# Patient Record
Sex: FEMALE | Race: WHITE | NOT HISPANIC OR LATINO | Employment: FULL TIME | URBAN - METROPOLITAN AREA
[De-identification: names, ages, dates, MRNs, and addresses within clinical notes are randomized per-mention and may not be internally consistent; named-entity substitution may affect disease eponyms.]

---

## 2024-02-27 ENCOUNTER — OFFICE VISIT (OUTPATIENT)
Dept: URGENT CARE | Facility: CLINIC | Age: 29
End: 2024-02-27
Payer: COMMERCIAL

## 2024-02-27 ENCOUNTER — APPOINTMENT (OUTPATIENT)
Dept: RADIOLOGY | Facility: IMAGING CENTER | Age: 29
End: 2024-02-27
Attending: PHYSICIAN ASSISTANT
Payer: COMMERCIAL

## 2024-02-27 VITALS
RESPIRATION RATE: 16 BRPM | TEMPERATURE: 98 F | WEIGHT: 186.3 LBS | SYSTOLIC BLOOD PRESSURE: 110 MMHG | DIASTOLIC BLOOD PRESSURE: 64 MMHG | OXYGEN SATURATION: 96 % | HEART RATE: 92 BPM | BODY MASS INDEX: 26.67 KG/M2 | HEIGHT: 70 IN

## 2024-02-27 DIAGNOSIS — S79.911A INJURY OF RIGHT HIP, INITIAL ENCOUNTER: ICD-10-CM

## 2024-02-27 DIAGNOSIS — S70.01XA CONTUSION OF RIGHT HIP, INITIAL ENCOUNTER: ICD-10-CM

## 2024-02-27 PROCEDURE — 73502 X-RAY EXAM HIP UNI 2-3 VIEWS: CPT | Mod: TC,RT | Performed by: PHYSICIAN ASSISTANT

## 2024-02-27 PROCEDURE — 3074F SYST BP LT 130 MM HG: CPT | Performed by: PHYSICIAN ASSISTANT

## 2024-02-27 PROCEDURE — 3078F DIAST BP <80 MM HG: CPT | Performed by: PHYSICIAN ASSISTANT

## 2024-02-27 PROCEDURE — 99204 OFFICE O/P NEW MOD 45 MIN: CPT | Performed by: PHYSICIAN ASSISTANT

## 2024-02-27 ASSESSMENT — ENCOUNTER SYMPTOMS
FEVER: 0
NAUSEA: 0
MYALGIAS: 0
SHORTNESS OF BREATH: 0
CHILLS: 0
COUGH: 0
CONSTIPATION: 0
VOMITING: 0
SORE THROAT: 0
ABDOMINAL PAIN: 0
EYE PAIN: 0
HEADACHES: 0
DIARRHEA: 0

## 2024-02-27 NOTE — LETTER
February 27, 2024    To Whom It May Concern:         This is confirmation that Lanette Zhang attended her scheduled appointment with Nicholas Martinez P.A.-C. on 2/27/24.  I recommend this patient be on light duty.  She needs the assistance of a cane or crutches.  She may even need to remain out of work for 1-5 days to recover or until she is directed otherwise by an orthopedist.          If you have any questions please do not hesitate to call me at the phone number listed below.    Sincerely,          Nicholas Martinez P.A.-C.  [phone]

## 2024-02-28 NOTE — PROGRESS NOTES
"Subjective:   Lanette Zhang is a 29 y.o. female who presents for Hip Injury (XR hip  fell on right side snowboarding and is now in severe pain )      29-year-old female had a mechanical fall while snowboarding 3 days ago.  She fell directly on her right hip while snowboarding.  She presumed that there is some bruising and soft tissue injury however over the last 3 days it does not seem to be improving.  She has pain with flexion.  She notes more pain with weightbearing.  She seems to wake up and her hip feels better and the more she walks or places weight on it seems to hurt more.  She has been using a cane that she borrowed to help offload weight.  No prior injury.    Review of Systems   Constitutional:  Negative for chills and fever.   HENT:  Negative for congestion, ear pain and sore throat.    Eyes:  Negative for pain.   Respiratory:  Negative for cough and shortness of breath.    Cardiovascular:  Negative for chest pain.   Gastrointestinal:  Negative for abdominal pain, constipation, diarrhea, nausea and vomiting.   Genitourinary:  Negative for dysuria.   Musculoskeletal:  Negative for myalgias.   Skin:  Negative for rash.   Neurological:  Negative for headaches.       Medications, Allergies, and current problem list reviewed today in Epic.     Objective:     /64 (BP Location: Right arm, Patient Position: Sitting, BP Cuff Size: Adult)   Pulse 92   Temp 36.7 °C (98 °F) (Skin)   Resp 16   Ht 1.778 m (5' 10\")   Wt 84.5 kg (186 lb 4.8 oz)   SpO2 96%     Physical Exam  Vitals reviewed.   Constitutional:       Appearance: Normal appearance.   HENT:      Head: Normocephalic and atraumatic.      Right Ear: External ear normal.      Left Ear: External ear normal.      Nose: Nose normal.      Mouth/Throat:      Mouth: Mucous membranes are moist.   Eyes:      Conjunctiva/sclera: Conjunctivae normal.   Cardiovascular:      Rate and Rhythm: Normal rate.   Pulmonary:      Effort: Pulmonary effort is normal. "   Musculoskeletal:      Comments: Nontender over lateral and anterior hip or greater trochanter.  No pain with internal/external logroll abduction adduction.  Pain past 30 degrees of flexion and flexed internal rotation.  Distally neurovascularly intact.   Skin:     General: Skin is warm and dry.      Capillary Refill: Capillary refill takes less than 2 seconds.   Neurological:      Mental Status: She is alert and oriented to person, place, and time.         RADIOLOGY RESULTS   DX-HIP-COMPLETE - UNILATERAL 2+ RIGHT    Result Date: 2/27/2024 2/27/2024 6:18 PM HISTORY/REASON FOR EXAM:  Pelvic/Hip Pain Following Trauma; right hip pain after snowboarding fall. pain with weight bearing, mostly medial/internal TECHNIQUE/EXAM DESCRIPTION AND NUMBER OF VIEWS:  2 views of the RIGHT hip. COMPARISON: None FINDINGS: Questionable lucency in the superior acetabulum. No joint osteoarthritis. Evaluation of the sacrum and bilateral iliac crests is limited due to overlying bowel content.     Questionable lucency in the superior acetabulum. This could be artifact or nondisplaced fracture. Further evaluation with CT is recommended.             Assessment/Plan:     Diagnosis and associated orders:     1. Contusion of right hip, initial encounter  DX-HIP-COMPLETE - UNILATERAL 2+ RIGHT    Referral to Orthopedics      2. Injury of right hip, initial encounter  Referral to Orthopedics         Comments/MDM:     Patient seen during imaging downtime.  I did a wet read and saw some possible injury to the acetabular socket, could be a acetabular soft tissue or cartilaginous injury.  Imaging pending at this time.  Recommend patient be nonweightbearing or weightbearing as tolerated with crutches, recommend follow-up with orthopedics, referral placed today.  Although limited wet read in clinic I did not appreciate a femur or femoral neck injury  Update: 2/28/24, formal read available which does show potential acetabular injury.  Called patient and  left voicemail encouraging close immediate follow up with orthopedics.          Differential diagnosis, natural history, supportive care, and indications for immediate follow-up discussed.    Advised the patient to follow-up with the primary care physician for recheck, reevaluation, and consideration of further management.    Please note that this dictation was created using voice recognition software. I have made a reasonable attempt to correct obvious errors, but I expect that there are errors of grammar and possibly content that I did not discover before finalizing the note.    This note was electronically signed by Nicholas Martinez PA-C

## 2025-05-24 ENCOUNTER — OCCUPATIONAL MEDICINE (OUTPATIENT)
Dept: URGENT CARE | Facility: CLINIC | Age: 30
End: 2025-05-24
Payer: COMMERCIAL

## 2025-05-24 VITALS
HEIGHT: 70 IN | WEIGHT: 187.2 LBS | BODY MASS INDEX: 26.8 KG/M2 | RESPIRATION RATE: 13 BRPM | DIASTOLIC BLOOD PRESSURE: 62 MMHG | HEART RATE: 86 BPM | SYSTOLIC BLOOD PRESSURE: 104 MMHG | OXYGEN SATURATION: 98 % | TEMPERATURE: 97.4 F
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
## 2025-05-24 PROCEDURE — 3078F DIAST BP <80 MM HG: CPT

## 2025-05-24 PROCEDURE — 3074F SYST BP LT 130 MM HG: CPT

## 2025-05-24 PROCEDURE — 90715 TDAP VACCINE 7 YRS/> IM: CPT

## 2025-05-24 PROCEDURE — 99213 OFFICE O/P EST LOW 20 MIN: CPT | Mod: 25

## 2025-05-24 PROCEDURE — 90471 IMMUNIZATION ADMIN: CPT

## 2025-05-24 RX ORDER — AMOXICILLIN AND CLAVULANATE POTASSIUM 500; 125 MG/1; MG/1
1 TABLET, FILM COATED ORAL 3 TIMES DAILY
COMMUNITY
End: 2025-05-24

## 2025-05-24 NOTE — LETTER
"  EMPLOYEE’S CLAIM FOR COMPENSATION/ REPORT OF INITIAL TREATMENT  FORM C-4  PLEASE TYPE OR PRINT    EMPLOYEE’S CLAIM - PROVIDE ALL INFORMATION REQUESTED   First Name                    SUSHMA Gama                  Last Name  Vicente Birthdate                    1995                Sex  [x]Female Claim Number (Insurer’s Use Only)     Mailing Address  [address] Age  30 y.o. Height  1.778 m (5' 10\") Weight  84.9 kg (187 lb 3.2 oz) Social Security Number     [facility] Zip  [address] Telephone  There are no phone numbers on file.   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 (if applicable)  restraining/trying to muzzle dog    How did this injury or occupational disease occur? (Be specific and answer in detail. Use additional sheet if necessary)  I claribel Pet w/ injured leg back to our treatment area for triage. Pet was unfriendly so tried to muzzle, Pet wiggled & was unable to muzzle, pet flipped over and chomped on hand   If you believe that you have an occupational disease, when did you first have knowledge of the disability and its relationship to your employment?  NA Witnesses to the Accident (if applicable)  Trupti Grace      Nature of Injury or " Occupational Disease  Puncture  Part(s) of Body Injured or Affected  Hand (R) N/A N/A    I CERTIFY THAT THE ABOVE IS TRUE AND CORRECT TO T HE BEST OF MY KNOWLEDGE AND THAT I HAVE PROVIDED THIS INFORMATION IN ORDER TO OBTAIN THE BENEFITS OF [place]CC[place], OR CHAPTER [place]).  I HEREBY AUTHORIZE ANY PHYSICIAN, CHIROPRACTOR, SURGEON, PRACTITIONER OR ANY OTHER PERSON, ANY HOSPITAL, INCLUDING [facility], ANY  MEDICAL SERVICE ORGANIZATION, ANY INSURANCE COMPANY, OR OTHER INSTITUTION OR ORGANIZATION TO RELEASE TO EACH OTHER, ANY MEDICAL OR OTHER INFORMATION, INCLUDING BENEFITS PAID OR PAYABLE, PERTINENT TO THIS INJURY OR DISEASE, EXCEPT INFORMATION RELATIVE TO DIAGNOSIS, TREATMENT AND/OR COUNSELING FOR AIDS, PSYCHOLOGICAL CONDITIONS, ALCOHOL OR CONTROLLED SUBSTANCES, FOR WHICH I MUST GIVE SPECIFIC AUTHORIZATION.  A PHOTOSTAT OF THIS AUTHORIZATION SHALL BE VALID AS THE ORIGINAL.     Date   Place Employee’s Original or  *Electronic Signature   THIS REPORT MUST BE COMPLETED AND MAILED WITHIN 3 WORKING DAYS OF TREATMENT   Place  [facility]    Name of [facility]   Date 5/24/2025 Diagnosis and Description of Injury or Occupational Disease  (W54.0XXA) Dog bite, initial encounter  (primary encounter diagnosis)  The encounter diagnosis was Dog bite, initial encounter. Is there evidence that the injured employee was under the influence of alcohol and/or another controlled substance at the time of accident?  []No  [] Yes (if yes, please explain)   Hour 9:21 PM  No   Treatment: ounds washed with NS and Hibiclens in office, Polysporin applied wounds are not actively bleeding, wounds dressed with self-adhesive bandages.  Home wound care reviewed.    Will have her follow-up in work restrictions: Patient is to keep wounds covered, will likely need to wear a glove due to the scattered nature of the  wound.  Otherwise no work restrictions are necessary      Have you advised the patient to remain off work five days or more?   No  [] Yes  If yes, indicate dates: From_ _                                                      To __ _  [] No   If no, is the injured employee capable of: [] full duty Yes   [] modified duty      If modified duty, specify any limitations / restrictions:__________________  ___ ___________________________     X-Ray Findings:  NA    From information given by the employee, together with medical evidence, can you directly connect this injury or occupational disease as job incurred?  []Yes   [] No Yes    Is additional medical care by a physician indicated? []Yes [] No  Yes    Do you know of any previous injury or disease contributing to this condition or occupational disease? []Yes [] No (Explain if yes)                          No   Date  5/24/2025 Print Health Care Provider’s Name  GENIE Coyle I certify that the employer’s copy of  this form was delivered to the employer on:   Address  [address] [place] [address] INSURER'S USE ONLY                       [facility] Zip  [address] Provider’s Tax ID Number  [id number]   Telephone  Dept: [phone]    Health Care Provider’s Original or Electronic Signature      e-SignPETROY MORGAN    Degree (MD,DO, DC,PA-C,APRN)    Choose (if applicable)      ORIGINAL - TREATING HEALTHCARE PROVIDER PAGE 2 - INSURER/TPA PAGE 3 - EMPLOYER PAGE 4 - EMPLOYEE             Form C-4 (rev.02/25)

## 2025-05-24 NOTE — LETTER
PHYSICIAN’S AND CHIROPRACTIC PHYSICIAN'S   PROGRESS REPORT   CERTIFICATION OF DISABILITY Claim Number:     Social Security Number:    Patient’s Name: Lanette Zhang Date of Injury: 5/24/2025   Employer:   Name of [employer]CO (if applicable):      Patient’s Job Description/Occupation: Tech Assistant/       Previous Injuries/Diseases/Surgeries Contributing to the Condition:  Denies      Diagnosis: (W54.0XXA) Dog bite, initial encounter  (primary encounter diagnosis)      Related to the Industrial Injury? Yes     Explain: Visit #1  DOI: 05/24/25  C/C: Dog bite, right hand    Per patient, she was at work, working with a [employer].  States she inaccurately assessed for to put the muscle, and the dog flipped over, resulting in some scattered superficial dog bites to her right hand.  Wounds are mostly located over the thumb, first interdigital webspace, second finger.  She reports little bruising of the finger just below the PIP joint otherwise denies foreign body sensation.  Bleeding controlled prior to arrival in urgent care.  Denies history of right hand injuries.  She is right-hand dominant.  Denies secondary.  Denies weakness, numbness/tingling sensation, reduced range of motion.  Does not recall when her last Tdap was administered.       Objective Medical Findings: Vitals and nursing note reviewed.   Constitutional:       General: She is not in acute distress.     Appearance: Normal appearance. She is not ill-appearing or diaphoretic.   HENT:      Head: Normocephalic.   Cardiovascular:      Rate and Rhythm: Normal rate and regular rhythm.      Heart sounds: Normal heart sounds.   Pulmonary:      Effort: Pulmonary effort is normal.      Breath sounds: Normal breath sounds.   Musculoskeletal:         General: No swelling, tenderness, deformity or signs of injury.   Skin:     General: Skin is warm and dry.      Capillary Refill: Capillary refill takes less than 2 seconds.      Findings: Bruising and wound present.       Comments: Scattered superficial bite wounds primarily present on the dorsal surface of the right hand along the thumb, first interdigital webspace, second finger.  No foreign bodies present.  Strength is normal and symmetric,  strength is 5 out of 5, sensation is intact to light and sharp touch, cap refill less than 2 seconds, 2+ radial pulse.  No edema, erythema, warmth, fluctuance, rash.  There is faint ecchymosis distal of the second PIP joint.  Hemostasis achieved prior to evaluation in office.    Neurological:      Mental Status: She is alert and oriented to person, place, and time.          None - Discharged                         Stable  No                 Ratable  No        Generally Improved                         Condition Worsened                  Condition Same  May Have Suffered a Permanent Disability No     Treatment Plan:    D39 and C4 completed today.  Patient received Tdap in office, she tolerated well.  Will start her on prophylactic Augmentin for dog bite sustained today.    Wounds washed with NS and Hibiclens in office, Polysporin applied wounds are not actively bleeding, wounds dressed with self-adhesive bandages.  Home wound care reviewed.    Will have her follow-up in work restrictions: Patient is to keep wounds covered, will likely need to wear a glove due to the scattered nature of the wound.  Otherwise no work restrictions are necessary  Will have her follow-up in 2 days for a wound check.         No Change in Therapy                  PT/OT Prescribed                      Medication May be Used While Working        Case Management                          PT/OT Discontinued    Consultation    Further Diagnostic Studies:    Prescription(s)                 Released to FULL DUTY /No Restrictions on (Date):       Certified TOTALLY TEMPORARILY DISABLED (Indicate Dates) From:   To:      Released to RESTRICTED/Modified Duty on (Date): From:   To:    Restrictions Are:         No Sitting     No Standing    No Pulling Other: Keep wounds covered, may need to wear a glove while at work.       No Bending at Waist     No Stooping     No Lifting        No Carrying     No Walking Lifting Restricted to (lbs.):          No Pushing        No Climbing     No Reaching Above Shoulders       Date of Next Visit:  5/26/2025 Date of this Exam: 5/24/2025 Physician/Chiropractic Physician Name: GENIE Coyle Physician/Chiropractic Physician Signature:  Fredy Albarado DO MPH     [place]:  [facility]  [facility], [address] [place], [place] [address] - Telephone [phone] [place]:  [address] [place] [address], [address] [place], [place] [address] - Telephone [phone]    [email or web address]  D-39 (Rev. 10/24)

## 2025-05-25 NOTE — PROGRESS NOTES
"Subjective:   Lanette Zhang is a 30 y.o. female who presents for Other (NEW WC DOI: 05/24/25 dog bite on right hand )      HPI:    Visit #1  DOI: 05/24/25  C/C: Dog bite, right hand    Per patient, she was at work, working with a [employer].  States she inaccurately assessed for to put the muscle, and the dog flipped over, resulting in some scattered superficial dog bites to her right hand.  Wounds are mostly located over the thumb, first interdigital webspace, second finger.  She reports little bruising of the finger just below the PIP joint otherwise denies foreign body sensation.  Bleeding controlled prior to arrival in urgent care.  Denies history of right hand injuries.  She is right-hand dominant.  Denies secondary.  Denies weakness, numbness/tingling sensation, reduced range of motion.  Does not recall when her last Tdap was administered.     ROS As above in HPI    Medications:    Medications Ordered Prior to Encounter[1]     Allergies:   Sulfa drugs    Problem List:   Problem List[2]     Surgical History:  No past surgical history on file.    Past Social Hx:   Social History[3]       Problem list, medications, and allergies reviewed by myself today in Epic.     Objective:     /62 (BP Location: Left arm, Patient Position: Sitting, BP Cuff Size: Adult)   Pulse 86   Temp 36.3 °C (97.4 °F) (Temporal)   Resp 13   Ht 1.778 m (5' 10\")   Wt 84.9 kg (187 lb 3.2 oz)   SpO2 98%   BMI 26.86 kg/m²     Physical Exam  Vitals and nursing note reviewed.   Constitutional:       General: She is not in acute distress.     Appearance: Normal appearance. She is not ill-appearing or diaphoretic.   HENT:      Head: Normocephalic.   Cardiovascular:      Rate and Rhythm: Normal rate and regular rhythm.      Heart sounds: Normal heart sounds.   Pulmonary:      Effort: Pulmonary effort is normal.      Breath sounds: Normal breath sounds.   Musculoskeletal:         General: No swelling, tenderness, deformity or signs of " injury.   Skin:     General: Skin is warm and dry.      Capillary Refill: Capillary refill takes less than 2 seconds.      Findings: Bruising and wound present.      Comments: Scattered superficial bite wounds primarily present on the dorsal surface of the right hand along the thumb, first interdigital webspace, second finger.  No foreign bodies present.  Strength is normal and symmetric,  strength is 5 out of 5, sensation is intact to light and sharp touch, cap refill less than 2 seconds, 2+ radial pulse.  No edema, erythema, warmth, fluctuance, rash.  There is faint ecchymosis distal of the second PIP joint.  Hemostasis achieved prior to evaluation in office.    Neurological:      Mental Status: She is alert and oriented to person, place, and time.         Assessment/Plan:       Diagnosis and associated orders:   1. Dog bite, initial encounter (Primary)  - Tdap =>8yo IM  - amoxicillin-clavulanate (AUGMENTIN) 875-125 MG Tab; Take 1 Tablet by mouth 2 times a day for 5 days.  Dispense: 10 Tablet; Refill: 0       Comments/MDM:       D39 and C4 completed today.  Patient received Tdap in office, she tolerated well.  Will start her on prophylactic Augmentin for dog bite sustained today.    Wounds washed with NS and Hibiclens in office, Polysporin applied wounds are not actively bleeding, wounds dressed with self-adhesive bandages.  Home wound care reviewed.    Will have her follow-up in work restrictions: Patient is to keep wounds covered, will likely need to wear a glove due to the scattered nature of the wound.  Otherwise no work restrictions are necessary  Will have her follow-up in 2 days for a wound check.       Return to clinic or go to ED if symptoms worsen or persist. Indications for ED discussed at length. Patient/Parent/Guardian voices understanding. Follow-up with your primary care provider in 3-5 days. Red flag symptoms discussed. All side effects of medication discussed including allergic response, GI  upset, tendon injury, rash, sedation etc.    Please note that this dictation was created using voice recognition software. I have made a reasonable attempt to correct obvious errors, but I expect that there are errors of grammar and possibly content that I did not discover before finalizing the note.    This note was electronically signed by MOR Hooks         [1]   Current Outpatient Medications on File Prior to Visit   Medication Sig Dispense Refill    IBUPROFEN PO Take  by mouth.       No current facility-administered medications on file prior to visit.   [2] There is no problem list on file for this patient.   [3]   Social History  Tobacco Use    Smoking status: Never    Smokeless tobacco: Never   Vaping Use    Vaping status: Every Day    Substances: Nicotine, THC    Devices: Disposable, Refillable tank, Pre-filled pod   Substance Use Topics    Alcohol use: Yes     Comment: occ    Drug use: Yes     Types: Marijuana

## 2025-05-26 ENCOUNTER — OCCUPATIONAL MEDICINE (OUTPATIENT)
Dept: URGENT CARE | Facility: CLINIC | Age: 30
End: 2025-05-26
Payer: COMMERCIAL

## 2025-05-26 VITALS
RESPIRATION RATE: 15 BRPM | SYSTOLIC BLOOD PRESSURE: 106 MMHG | OXYGEN SATURATION: 99 % | TEMPERATURE: 97.7 F | HEART RATE: 82 BPM | WEIGHT: 187.2 LBS | DIASTOLIC BLOOD PRESSURE: 78 MMHG | BODY MASS INDEX: 26.8 KG/M2 | HEIGHT: 70 IN
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
## 2025-05-26 PROCEDURE — 3078F DIAST BP <80 MM HG: CPT | Performed by: STUDENT IN AN ORGANIZED HEALTH CARE EDUCATION/TRAINING PROGRAM

## 2025-05-26 PROCEDURE — 99213 OFFICE O/P EST LOW 20 MIN: CPT | Performed by: STUDENT IN AN ORGANIZED HEALTH CARE EDUCATION/TRAINING PROGRAM

## 2025-05-26 PROCEDURE — 3074F SYST BP LT 130 MM HG: CPT | Performed by: STUDENT IN AN ORGANIZED HEALTH CARE EDUCATION/TRAINING PROGRAM

## 2025-05-26 RX ORDER — AMOXICILLIN AND CLAVULANATE POTASSIUM 500; 125 MG/1; MG/1
TABLET, FILM COATED ORAL
COMMUNITY
Start: 2025-05-19

## 2025-05-26 NOTE — PROGRESS NOTES
"Subjective:     Lanette Zhang is a 30 y.o. female who presents for Work-Related Injury ( [facility]- DOI 5.24.25 - R hand - [facility]. )    Today's date: 5/26/2025.  Visit #2.    Patient is here to follow-up of the dog bite she sustained to her right index and right thumb finger a few days ago.  At her initial visit she was sent a prescription for Augmentin which she has not yet started.  States her fingers feel slightly bruised but is not experiencing worsening or new pain.  No significant redness or swelling.  No additional complaints or concerns.       PMH:   No pertinent past medical history to this problem  MEDS:  Medications were reviewed in EMR  ALLERGIES:  Allergies were reviewed in EMR  FH:   No pertinent family history to this problem       Objective:     /78 (BP Location: Left arm, Patient Position: Sitting, BP Cuff Size: Adult)   Pulse 82   Temp 36.5 °C (97.7 °F) (Temporal)   Resp 15   Ht 1.778 m (5' 10\")   Wt 84.9 kg (187 lb 3.2 oz)   SpO2 99%   BMI 26.86 kg/m²     Gen: no acute distress, normal voice  Skin: dry, intact, moist mucosal membranes  Head: Atraumatic, normocephalic  Psych: normal affect, normal judgement, alert, awake  Musculoskeletal: Right thumb with 2 superficial puncture wounds.  Right index finger with approximately 3 superficial puncture wounds.  Wounds are appropriately healing without any surrounding erythema, edema or signs of subcutaneous extension.  Full range of motion of digits associated with mild discernible discomfort with terminal flexion.  No motor or sensory deficits with good perfusion of soft tissue.      Assessment/Plan:       1. Dog bite, subsequent encounter    Other orders  - amoxicillin-clavulanate (AUGMENTIN) 500-125 MG Tab      FROM   TO            - Instructed to begin Augmentin  -No work restrictions; follow-up Friday at the end of this week would likely be able to discharge at that time    "

## 2025-05-26 NOTE — LETTER
PHYSICIAN’S AND CHIROPRACTIC PHYSICIAN'S   PROGRESS REPORT   CERTIFICATION OF DISABILITY Claim Number:     Social Security Number:    Patient’s Name: Lanette Zhang Date of Injury: 5/24/2025   Employer:   Name of MCO (if applicable):      Patient’s Job Description/Occupation: Tech Assistant/       Previous Injuries/Diseases/Surgeries Contributing to the Condition:  Today's date: 5/26/2025.  Visit #2.    Patient is here to follow-up of the dog bite she sustained to her right index and right thumb finger a few days ago.  At her initial visit she was sent a prescription for Augmentin which she has not yet started.  States her fingers feel slightly bruised but is not experiencing worsening or new pain.  No significant redness or swelling.  No additional complaints or concerns.      Diagnosis: (W54.0XXD) Dog bite, subsequent encounter  (primary encounter diagnosis)      Related to the Industrial Injury? Yes     Explain: Happened at work      Objective Medical Findings: Gen: no acute distress, normal voice  Skin: dry, intact, moist mucosal membranes  Head: Atraumatic, normocephalic  Psych: normal affect, normal judgement, alert, awake  Musculoskeletal: Right thumb with 2 superficial puncture wounds.  Right index finger with approximately 3 superficial puncture wounds.  Wounds are appropriately healing without any surrounding erythema, edema or signs of subcutaneous extension.  Full range of motion of digits associated with mild discernible discomfort with terminal flexion.  No motor or sensory deficits with good perfusion of soft tissue.           None - Discharged                         Stable  No                 Ratable  No        Generally Improved                         Condition Worsened                  Condition Same  May Have Suffered a Permanent Disability No     Treatment Plan:    - Instructed to begin Augmentin  -No work restrictions; follow-up Friday at the end of this week would likely be able to  discharge at that time         No Change in Therapy                  PT/OT Prescribed                      Medication May be Used While Working        Case Management                          PT/OT Discontinued    Consultation    Further Diagnostic Studies:    Prescription(s)               X  Released to FULL DUTY /No Restrictions on (Date):       Certified TOTALLY TEMPORARILY DISABLED (Indicate Dates) From:   To:      Released to RESTRICTED/Modified Duty on (Date): From:   To:    Restrictions Are:         No Sitting    No Standing    No Pulling Other:         No Bending at Waist     No Stooping     No Lifting        No Carrying     No Walking Lifting Restricted to (lbs.):          No Pushing        No Climbing     No Reaching Above Shoulders       Date of Next Visit:  5/30/2025 Date of this Exam: 5/26/2025 Physician/Chiropractic Physician Name: Khurram Watson D.O. Physician/Chiropractic Physician Signature:  Fredy Albarado DO [facility]:  [address] [place] [address], [address] [place], [place] [address] - Telephone [phone] [place]:  [address] [place] [address], [address] [place], [place] [address] - Telephone [phone]    [email or web address]  D-39 (Rev. 10/24)

## 2025-05-30 ENCOUNTER — OCCUPATIONAL MEDICINE (OUTPATIENT)
Dept: URGENT CARE | Facility: CLINIC | Age: 30
End: 2025-05-30
Payer: COMMERCIAL

## 2025-05-30 VITALS
HEIGHT: 70 IN | DIASTOLIC BLOOD PRESSURE: 70 MMHG | BODY MASS INDEX: 26.58 KG/M2 | WEIGHT: 185.7 LBS | RESPIRATION RATE: 20 BRPM | TEMPERATURE: 98 F | HEART RATE: 71 BPM | SYSTOLIC BLOOD PRESSURE: 110 MMHG | OXYGEN SATURATION: 98 %
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
## 2025-05-30 NOTE — PROGRESS NOTES
"Subjective     Lanette Zhang is a 30 y.o. female who presents with Follow-Up (X5/24/25 Right thumb/index finger/dog bite)    Visit #1  DOI: 05/24/25  C/C: Dog bite, right hand   Per patient, she was at work, working with a [employer].  States she inaccurately assessed for to put the muscle, and the dog flipped over, resulting in some scattered superficial dog bites to her right hand.  Wounds are mostly located over the thumb, first interdigital webspace, second finger.  She reports little bruising of the finger just below the PIP joint otherwise denies foreign body sensation.  Bleeding controlled prior to arrival in urgent care.  Denies history of right hand injuries.  She is right-hand dominant.  Denies secondary.  Denies weakness, numbness/tingling sensation, reduced range of motion.  Does not recall when her last Tdap was administered.    ** Today 5/30/2025: Third visit for dog bite wounds check of the right hand.  Has been on antibiotics for 2 or 3 days now.  Pain is slowly improving and is using right hand as tolerated while at work and try not to lift heavy things.          HPI    Review of Systems   All other systems reviewed and are negative.             Objective     /70   Pulse 71   Temp 36.7 °C (98 °F) (Temporal)   Resp 20   Ht 1.778 m (5' 10\")   Wt 84.2 kg (185 lb 11.2 oz)   LMP 05/10/2025 (Exact Date)   SpO2 98%   BMI 26.65 kg/m²      Physical Exam  Musculoskeletal:        Hands:       Comments: Right hand with some superficial puncture wounds over the right index and thumb joints         Superficial puncture wounds over the right thumb and index finger          1. Dog bite, subsequent encounter            10-day recheck sooner if needed    Over-the-counter Motrin or naproxen as needed    Use right hand only as tolerated while at work     "

## 2025-05-30 NOTE — LETTER
PHYSICIAN’S AND CHIROPRACTIC PHYSICIAN'S   PROGRESS REPORT   CERTIFICATION OF DISABILITY Claim Number:     Social Security Number:    Patient’s Name: Lanette Zhang Date of Injury: 5/24/2025   Employer:  [employer] Name of MCO (if applicable):      Patient’s Job Description/Occupation:        Previous Injuries/Diseases/Surgeries Contributing to the Condition:  None none      Diagnosis: (W54.0XXD) Dog bite, subsequent encounter  (primary encounter diagnosis)      Related to the Industrial Injury? Yes     Explain: Dog bite      Objective Medical Findings: Superficial puncture wounds over the right thumb and index finger         None - Discharged                         Stable  No                 Ratable  No     X   Generally Improved                         Condition Worsened                  Condition Same  May Have Suffered a Permanent Disability No     Treatment Plan:              No Change in Therapy                  PT/OT Prescribed                      Medication May be Used While Working        Case Management                          PT/OT Discontinued    Consultation    Further Diagnostic Studies:    Prescription(s)                 Released to FULL DUTY /No Restrictions on (Date):       Certified TOTALLY TEMPORARILY DISABLED (Indicate Dates) From:   To:    X  Released to RESTRICTED/Modified Duty on (Date): From: 5/30/2025 To: 6/9/2025  Restrictions Are:  Temporary      No Sitting    No Standing    No Pulling Other: Use right hand only as tolerated       No Bending at Waist     No Stooping     No Lifting        No Carrying     No Walking Lifting Restricted to (lbs.):          No Pushing        No Climbing     No Reaching Above Shoulders       Date of Next Visit:  6/9/2025 Date of this Exam: 5/30/2025 Physician/Chiropractic Physician Name: Cristobal Bran M.D. Physician/Chiropractic Physician Signature:  Fredy Albarado DO MPH     [place]:  [facility]  [facility], [address] [facility]  [place] [address] - Telephone [phone] [place]:  [address]  [facility], [address] [place], [place] [address] - Telephone [phone]    [email or web address]  D-39 (Rev. 10/24)

## 2025-06-11 ENCOUNTER — OCCUPATIONAL MEDICINE (OUTPATIENT)
Dept: URGENT CARE | Facility: CLINIC | Age: 30
End: 2025-06-11
Payer: COMMERCIAL

## 2025-06-11 VITALS
WEIGHT: 187 LBS | RESPIRATION RATE: 12 BRPM | HEIGHT: 70 IN | TEMPERATURE: 97.1 F | SYSTOLIC BLOOD PRESSURE: 112 MMHG | OXYGEN SATURATION: 98 % | DIASTOLIC BLOOD PRESSURE: 62 MMHG | HEART RATE: 80 BPM | BODY MASS INDEX: 26.77 KG/M2
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
## 2025-06-11 PROCEDURE — 3078F DIAST BP <80 MM HG: CPT | Performed by: PHYSICIAN ASSISTANT

## 2025-06-11 PROCEDURE — 99213 OFFICE O/P EST LOW 20 MIN: CPT | Performed by: PHYSICIAN ASSISTANT

## 2025-06-11 PROCEDURE — 3074F SYST BP LT 130 MM HG: CPT | Performed by: PHYSICIAN ASSISTANT

## 2025-06-11 NOTE — LETTER
PHYSICIAN’S AND CHIROPRACTIC PHYSICIAN'S   PROGRESS REPORT   CERTIFICATION OF DISABILITY Claim Number:     Social Security Number:    Patient’s Name: Lanette Zhang Date of Injury: 5/24/2025   Employer:  [employer]  Name of MCO (if applicable):      Patient’s Job Description/Occupation: Tech Assistant/       Previous Injuries/Diseases/Surgeries Contributing to the Condition:  DOI: 5/24/2025  Patient returns urgent care for fourth Worker's Comp. evaluation regarding dog bite.  She states: She has finished course of antibiotics and hand feels back to normal.  She has slight tenderness in 1 scabbed area but in general wounds have healed without concern and has been using hand normally while at work.  She feels ready to close case MMI at this time.      Diagnosis: (W54.0XXD) Dog bite, subsequent encounter  (primary encounter diagnosis)      Related to the Industrial Injury? Yes     Explain:        Objective Medical Findings: Gen: AOx3; Head: NC AT; Eyes: PERRLA/EOM; Lungs: NLR; Cardiac: RR by periph pulse exam; right hand: No open wounds, no erythema, no ecchymosis, no edema, 1 scab that remains other wounds have completely healed; neuro: NVID, normal sensation to light touch, brisk capillary refill, normal interosseous strength         None - Discharged                         Stable  Yes                 Ratable  No     X   Generally Improved                         Condition Worsened                  Condition Same  May Have Suffered a Permanent Disability No     Treatment Plan:    Close case MMI         No Change in Therapy                  PT/OT Prescribed                      Medication May be Used While Working        Case Management                          PT/OT Discontinued    Consultation    Further Diagnostic Studies:    Prescription(s)               X  Released to FULL DUTY /No Restrictions on (Date):  6/11/2025    Certified TOTALLY TEMPORARILY DISABLED  (Indicate Dates) From:   To:      Released to RESTRICTED/Modified Duty on (Date): From:   To:    Restrictions Are:         No Sitting    No Standing    No Pulling Other: Close case MMI       No Bending at Waist     No Stooping     No Lifting        No Carrying     No Walking Lifting Restricted to (lbs.):          No Pushing        No Climbing     No Reaching Above Shoulders       Date of Next Visit:    Date of this Exam: 6/11/2025 Physician/Chiropractic Physician Name: Christopher Greene P.A.-C. Physician/Chiropractic Physician Signature:  Fredy Albarado DO MPH     [place]:  [address] [place] [address], [address] [place], [place] [address] - Telephone [phone] [place]:  [address] [place] [address], [address] [place], [place] [address] - Telephone [phone]    [email or web address]  D-39 (Rev. 10/24)

## 2025-06-11 NOTE — PROGRESS NOTES
"Subjective:     Lanette Zhang is a 30 y.o. female who presents for Follow-Up (Workers comp follow up )    DOI: 5/24/2025   Patient returns urgent care for fourth Worker's Comp. evaluation regarding dog bite.  She states: She has finished course of antibiotics and hand feels back to normal.  She has slight tenderness in 1 scabbed area but in general wounds have healed without concern and has been using hand normally while at work.  She feels ready to close case MMI at this time.        PMH:   No pertinent past medical history to this problem  MEDS:  Medications were reviewed in EMR  ALLERGIES:  Allergies were reviewed in EMR  FH:   No pertinent family history to this problem       Objective:     /62 (BP Location: Left arm, Patient Position: Sitting)   Pulse 80   Temp 36.2 °C (97.1 °F) (Temporal)   Resp 12   Ht 1.778 m (5' 10\")   Wt 84.8 kg (187 lb)   LMP 05/10/2025 (Exact Date)   SpO2 98%   BMI 26.83 kg/m²     Gen: AOx3; Head: NC AT; Eyes: PERRLA/EOM; Lungs: NLR; Cardiac: RR by periph pulse exam; right hand: No open wounds, no erythema, no ecchymosis, no edema, 1 scab that remains other wounds have completely healed; neuro: NVID, normal sensation to light touch, brisk capillary refill, normal interosseous strength    Assessment/Plan:       1. Dog bite, subsequent encounter      FROM   TO    Close case MMI       Differential diagnosis, natural history, supportive care, and indications for immediate follow-up discussed.    "

## 2025-08-27 ENCOUNTER — OCCUPATIONAL MEDICINE (OUTPATIENT)
Dept: URGENT CARE | Facility: CLINIC | Age: 30
End: 2025-08-27
Payer: COMMERCIAL

## 2025-08-27 VITALS
HEIGHT: 70 IN | HEART RATE: 73 BPM | OXYGEN SATURATION: 98 % | TEMPERATURE: 97.8 F | DIASTOLIC BLOOD PRESSURE: 72 MMHG | RESPIRATION RATE: 16 BRPM | BODY MASS INDEX: 27.2 KG/M2 | SYSTOLIC BLOOD PRESSURE: 112 MMHG | WEIGHT: 190 LBS

## 2025-08-27 DIAGNOSIS — S39.012A LUMBAR STRAIN, INITIAL ENCOUNTER: Primary | ICD-10-CM
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
## 2025-08-27 RX ORDER — CYCLOBENZAPRINE HCL 5 MG
5-10 TABLET ORAL
Qty: 15 TABLET | Refills: 0 | Status: SHIPPED | OUTPATIENT
Start: 2025-08-27